# Patient Record
Sex: MALE | Race: WHITE
[De-identification: names, ages, dates, MRNs, and addresses within clinical notes are randomized per-mention and may not be internally consistent; named-entity substitution may affect disease eponyms.]

---

## 2021-03-15 ENCOUNTER — HOSPITAL ENCOUNTER (EMERGENCY)
Dept: HOSPITAL 61 - ER | Age: 61
Discharge: HOME | End: 2021-03-15
Payer: COMMERCIAL

## 2021-03-15 VITALS
SYSTOLIC BLOOD PRESSURE: 148 MMHG | SYSTOLIC BLOOD PRESSURE: 148 MMHG | DIASTOLIC BLOOD PRESSURE: 78 MMHG | DIASTOLIC BLOOD PRESSURE: 78 MMHG

## 2021-03-15 VITALS — HEIGHT: 64 IN | BODY MASS INDEX: 51.19 KG/M2 | WEIGHT: 299.83 LBS

## 2021-03-15 DIAGNOSIS — M79.671: Primary | ICD-10-CM

## 2021-03-15 DIAGNOSIS — R60.0: ICD-10-CM

## 2021-03-15 DIAGNOSIS — R06.02: ICD-10-CM

## 2021-03-15 DIAGNOSIS — Z98.890: ICD-10-CM

## 2021-03-15 LAB
ALBUMIN SERPL-MCNC: 3.6 G/DL (ref 3.4–5)
ALBUMIN/GLOB SERPL: 0.9 {RATIO} (ref 1–1.7)
ALP SERPL-CCNC: 75 U/L (ref 46–116)
ALT SERPL-CCNC: 33 U/L (ref 16–63)
ANION GAP SERPL CALC-SCNC: 5 MMOL/L (ref 6–14)
APTT BLD: 28 SEC (ref 24–38)
AST SERPL-CCNC: 21 U/L (ref 15–37)
BASOPHILS # BLD AUTO: 0.1 X10^3/UL (ref 0–0.2)
BASOPHILS NFR BLD: 1 % (ref 0–3)
BILIRUB SERPL-MCNC: 0.4 MG/DL (ref 0.2–1)
BUN SERPL-MCNC: 10 MG/DL (ref 8–26)
BUN/CREAT SERPL: 11 (ref 6–20)
CALCIUM SERPL-MCNC: 8.8 MG/DL (ref 8.5–10.1)
CHLORIDE SERPL-SCNC: 98 MMOL/L (ref 98–107)
CO2 SERPL-SCNC: 35 MMOL/L (ref 21–32)
CREAT SERPL-MCNC: 0.9 MG/DL (ref 0.7–1.3)
EOSINOPHIL NFR BLD: 0.2 X10^3/UL (ref 0–0.7)
EOSINOPHIL NFR BLD: 2 % (ref 0–3)
ERYTHROCYTE [DISTWIDTH] IN BLOOD BY AUTOMATED COUNT: 14.1 % (ref 11.5–14.5)
GFR SERPLBLD BASED ON 1.73 SQ M-ARVRAT: 86.7 ML/MIN
GLUCOSE SERPL-MCNC: 80 MG/DL (ref 70–99)
HCT VFR BLD CALC: 48.8 % (ref 39–53)
HGB BLD-MCNC: 16.6 G/DL (ref 13–17.5)
LYMPHOCYTES # BLD: 1.8 X10^3/UL (ref 1–4.8)
LYMPHOCYTES NFR BLD AUTO: 24 % (ref 24–48)
MAGNESIUM SERPL-MCNC: 2 MG/DL (ref 1.8–2.4)
MCH RBC QN AUTO: 31 PG (ref 25–35)
MCHC RBC AUTO-ENTMCNC: 34 G/DL (ref 31–37)
MCV RBC AUTO: 92 FL (ref 79–100)
MONO #: 0.6 X10^3/UL (ref 0–1.1)
MONOCYTES NFR BLD: 8 % (ref 0–9)
NEUT #: 5 X10^3/UL (ref 1.8–7.7)
NEUTROPHILS NFR BLD AUTO: 65 % (ref 31–73)
PLATELET # BLD AUTO: 230 X10^3/UL (ref 140–400)
POTASSIUM SERPL-SCNC: 4.3 MMOL/L (ref 3.5–5.1)
PROT SERPL-MCNC: 7.7 G/DL (ref 6.4–8.2)
PROTHROMBIN TIME: 12.8 SEC (ref 11.7–14)
RBC # BLD AUTO: 5.32 X10^6/UL (ref 4.3–5.7)
SODIUM SERPL-SCNC: 138 MMOL/L (ref 136–145)
WBC # BLD AUTO: 7.6 X10^3/UL (ref 4–11)

## 2021-03-15 PROCEDURE — 93005 ELECTROCARDIOGRAM TRACING: CPT

## 2021-03-15 PROCEDURE — 83880 ASSAY OF NATRIURETIC PEPTIDE: CPT

## 2021-03-15 PROCEDURE — 80053 COMPREHEN METABOLIC PANEL: CPT

## 2021-03-15 PROCEDURE — 36415 COLL VENOUS BLD VENIPUNCTURE: CPT

## 2021-03-15 PROCEDURE — 84484 ASSAY OF TROPONIN QUANT: CPT

## 2021-03-15 PROCEDURE — 71045 X-RAY EXAM CHEST 1 VIEW: CPT

## 2021-03-15 PROCEDURE — 93970 EXTREMITY STUDY: CPT

## 2021-03-15 PROCEDURE — 83735 ASSAY OF MAGNESIUM: CPT

## 2021-03-15 PROCEDURE — 85730 THROMBOPLASTIN TIME PARTIAL: CPT

## 2021-03-15 PROCEDURE — 99285 EMERGENCY DEPT VISIT HI MDM: CPT

## 2021-03-15 PROCEDURE — 85025 COMPLETE CBC W/AUTO DIFF WBC: CPT

## 2021-03-15 PROCEDURE — 85610 PROTHROMBIN TIME: CPT

## 2021-03-15 NOTE — RAD
INDICATION: Reason: NASIM  12 / Spl. Instructions:  / History: . Leg swelling. Concern for edema.



COMPARISON: None.



FINDINGS:



Single view of chest obtained.

Cardiomediastinal silhouette is prominent in size. Prominence of the interstitial markings bilaterall
y as well as fullness in the bilateral pulmonary hilum. Left lung base is obscured by cardiac silhoue
tte.





IMPRESSION:



*  Enlarged cardiomediastinal silhouette.



*  Mild interstitial prominence bilaterally. Causes such as mild pulmonary vascular congestion could 
have this appearance as well as mild interstitial infiltrate.



Electronically signed by: Juliano Patel MD (3/15/2021 6:58 PM) [id number]

## 2021-03-15 NOTE — EKG
[facility]

              [address] [place] [address]

Test Date:    2021-03-15               Test Time:    18:58:56

Pat Name:     IVON RODRIGUEZ            Department:   

Patient ID:   [id number]           Room:          

Gender:       M                        Technician:   

:          [id number]               Requested By: PHILLIP IGLESIAS

Order Number: [id number]           Reading MD:     

                                 Measurements

Intervals                              Axis          

Rate:         78                       P:            38

RI:           154                      QRS:          30

QRSD:         96                       T:            12

QT:           356                                    

QTc:          409                                    

                           Interpretive Statements

SINUS RHYTHM

QRS(T) CONTOUR ABNORMALITY

CONSIDER INFERIOR MYOCARDIAL DAMAGE

POSSIBLY ABNORMAL ECG

RI6.01

No previous ECG available for comparison

## 2021-03-15 NOTE — RAD
INDICATION: Reason: bl leg swelling, R worse than L / Spl. Instructions:  / History: 



COMPARISON: None.



TECHNIQUE: Grayscale, color and doppler ultrasound images were obtained of the bilateral lower extrem
ity venous vasculature. Some limitation secondary to overlying structures obscuring.



RIGHT:



No thrombus identified in the common femoral vein, femoral vein, popliteal vein or visualized calf ve
ins.



LEFT:



No thrombus identified in the common femoral vein, femoral vein, popliteal vein or visualized calf ve
ins.



IMPRESSION:



*   No thrombus identified in deep venous system of bilateral lower extremities.



Electronically signed by: Juliano Patel MD (3/15/2021 6:09 PM) [id number]

## 2021-03-15 NOTE — PHYS DOC
Past Medical History


Past Medical History:  No Pertinent History


 (PHILLIP IGLESIAS DO)


Past Surgical History:  Other


Additional Past Surgical Histo:  carpal tunnel


 (PHILLIP IGLESIAS DO)


Smoking Status:  Unknown if ever smoked


Additional Information:  


chew tobacco


Alcohol Use:  None


 (PHILLIP IGLESIAS DO)





General Adult


EDM:


Chief Complaint:  LOWER EXT PAIN





HPI:


HPI:


60-year-old male past medical history of obesity, presents the ED with 

complaints of " burning in my right leg," for the past week that is exacerbated 

when bearing weight on an extended knee.  Reports pain becomes sharp and shoots 

up his posterior leg but does not cross his knee.  Was seen at [place] 

[facility] and referred here for lower extremity ultrasound, concern for

DVT.  Patient has worked [employer] and [employer] for 28 years. 

Covid vaccine is up-to-date.  No known history of Covid.  Does not have a PCP 

for preventative routine care.  Takes no medications.  Denies any tobacco use or

alcohol abuse.  When asked if he is short of breath he states "a little winded 

only when I walk real far," but he attributes the shortness of breath to the 

radiating right foot pain (started after foot pain onset).  Not recall any prior

trauma or injury to the right lower extremity.  No history of recent 

fluoroquinolone abuse.  No falls or head injury.  FH-biological father with ACS.


 (PHILLIP IGLESIAS DO)





Review of Systems:


Review of Systems:


Constitutional:   Denies fever or chills. []


Eyes:   Denies change in visual acuity. []


HENT:   Denies nasal congestion or sore throat. [] 


Respiratory:   Denies cough or increased work of breathing or hemoptysis


Cardiovascular:   Denies chest pain or edema. [] 


GI:   Denies abdominal pain, nausea, vomiting, bloody stools or diarrhea. [] 


:  Denies dysuria or hematuria


Musculoskeletal:   Denies back pain or joint deformity


Integument:   Denies rash or diaphoresis


Neurologic:   Denies headache, focal weakness or sensory changes. [] 


Endocrine:   Denies polyuria or polydipsia. [] 


Lymphatic:  Denies swollen glands. [] 


Psychiatric:  Denies depression or anxiety. []


 (PHILLIP IGLESIAS DO)





Heart Score:


C/O Chest Pain:  No


Risk Factors:


Risk Factors:  DM, Current or recent (<one month) smoker, HTN, HLP, family 

history of CAD, obesity.


Risk Scores:


Score 0 - 3:  2.5% MACE over next 6 weeks - Discharge Home


Score 4 - 6:  20.3% MACE over next 6 weeks - Admit for Clinical Observation


Score 7 - 10:  72.7% MACE over next 6 weeks - Early Invasive Strategies


 (PHILLIP IGLESIAS DO)


C/O Chest Pain:  No


 (TARA FLOYD DO)





Current Medications:





Current Medications








 Medications


  (Trade)  Dose


 Ordered  Sig/Abisai  Start Time


 Stop Time Status Last Admin


Dose Admin


 


 Diazepam


  (Valium)  5 mg  1X  ONCE  3/15/21 17:45


 3/15/21 17:46   











 [facility])





Allergies:


Allergies:





Allergies








Coded Allergies Type Severity Reaction Last Updated Verified


 


  No Known Drug Allergies    3/15/21 No








 (PHILLIP IGLESIAS DO)





Physical Exam:


PE:





Constitutional: Well developed, well nourished, no acute distress, non-toxic 

appearance, obese


HENT: Normocephalic, atraumatic,


Eyes: EOMI, conjunctiva normal, no discharge.  


Neck: Normal range of motion,  supple, 


Cardiovascular: S1/2 present, regular rhythm


Lungs & Thorax: Speaking in full sentences, bilateral equal chest rise, no 

tachypnea or increased work of breathing, 94% room air


Abdomen:  soft, no tenderness, 


Skin: Warm, dry, no erythema, very dry skin over feet/posterior proximal calve 

w/scaling- superimposed fungal infection


Extremities: No tenderness, no cyanosis, bilateral lower extremity edema 

(appears equal) with DP pulses intact, slight pedal cyanosis although cap refill

 < 1 second, no severe pain out of proportion


Neurologic: Alert and oriented X 3, normal motor function, normal sensory 

function, no focal deficits noted. []


Psychologic: Affect normal, judgement normal, mood normal. []


 ([facility])





Current Patient Data:


Vital Signs:





                                   Vital Signs








  Date Time  Temp Pulse Resp B/P (MAP) Pulse Ox O2 Delivery O2 Flow Rate FiO2


 


3/15/21 16:20 98.3 77 20 159/76 (103) 94 Room Air  





 98.3       








 ([facility])





EKG:


EKG:


[]


 ([facility])


EKG:


EKG performed at 1858 heart rate 78 sinus rhythm no ST elevation no ST 

depression acute MI


 (TARA FLOYD DO)


Radiology/Procedures:


Radiology/Procedures:


[]


 (PHILLIP IGLESIAS DO)


Impression:





INDICATION: Reason: bl leg swelling, R worse than L / Spl. Instructions:  / 

History: 





COMPARISON: None.





TECHNIQUE: Grayscale, color and doppler ultrasound images were obtained of the 

bilateral lower extremity venous vasculature. Some limitation secondary to 

overlying structures obscuring.





RIGHT:





No thrombus identified in the common femoral vein, femoral vein, popliteal vein 

or visualized calf veins.





LEFT:





No thrombus identified in the common femoral vein, femoral vein, popliteal vein 

or visualized calf veins.





IMPRESSION:





*   No thrombus identified in deep venous system of bilateral lower extremities.





Electronically signed by: Juliano Patel MD (3/15/2021 6:09 PM) [id number]











IMPRESSION:





*  Enlarged cardiomediastinal silhouette.





*  Mild interstitial prominence bilaterally. Causes such as mild pulmonary 

vascular congestion could have this appearance as well as mild interstitial 

infiltrate.


 (TARA FLOYD DO)


Course & Med Decision Making:


Course & Med Decision Making


Pertinent Labs and Imaging studies reviewed. (See chart for details)





Concern for bilateral lower extremity swelling with right lower extremity 

radicular pain and exertional dyspnea for 1 week. Pt is pending labs, cxr, ekg 

and le ultrasound. Due to shift change, patient was signed out to oncoming 

physician Dr. Branch for further evaluation disposition.


 (PHILLIP IGLESIAS DO)


Course & Med Decision Making


Assumed care at shift change 1800hrs.


Pending radiologic imaging and labs.


US negative for DVT.


CXR radiologist concern for vascular congestion.


Labs reviewed - Troponin negative.


BNP pending.





Discussed radiology and labs with patient @ 2000hrs.


Patient would like to be discharged.


States only short of breath when walking-- attributes to his leg pain.





Will discharge patient at requested.


Will Rx lasix and ultram.


Patient states he can follow up with new PCP on Thursday.


Patient will also be provided of PCP to follow up with.


 


 (TARA FLOYD DO)


Dragon Disclaimer:


Dragon Disclaimer:


This electronic medical record was generated, in whole or in part, using a voice

 recognition dictation system.


 (PHILLIP IGLESIAS DO)





Departure


Departure


Impression:  


   Primary Impression:  


   Radicular pain of right lower extremity


   Additional Impressions:  


   Leg edema


   Leg pain


Disposition:  01 DC HOME SELF CARE/HOMELESS


Condition:  STABLE


Referrals:  


NO PCP (PCP)


Patient Instructions:  Edema


Scripts


Tramadol Hcl (ULTRAM) 50 Mg Tablet


1 TAB PO PRN Q6HRS PRN for pain MDD 4 Tablet(s) for 7 Days, #28 TAB 0 Refills


   Prov: TARA FLOYD DO         3/15/21 


Furosemide (LASIX) 20 Mg Tablet


20 MG PO DAILY, #20 TAB


   Prov: TARA FLOYD DO         3/15/21











PHILLIP IGLESIAS DO               Mar 15, 2021 17:46


TARA FLOYD DO            Mar 15, 2021 19:12

## 2021-03-22 ENCOUNTER — HOSPITAL ENCOUNTER (OUTPATIENT)
Dept: HOSPITAL 61 - KCIC | Age: 61
End: 2021-03-22
Attending: FAMILY MEDICINE
Payer: COMMERCIAL

## 2021-03-22 DIAGNOSIS — M79.661: ICD-10-CM

## 2021-03-22 DIAGNOSIS — M25.561: Primary | ICD-10-CM

## 2021-03-22 PROCEDURE — 73590 X-RAY EXAM OF LOWER LEG: CPT

## 2021-03-22 PROCEDURE — 73562 X-RAY EXAM OF KNEE 3: CPT

## 2021-03-22 NOTE — KCIC
Exam performed: 2 views right tibia fibula and   views 3 knee. 



Clinical indication: Right knee and neck pain



Date of Service: 3/22/2021 Comparison: None available



Findings:



AP and lateral view of the right tibia fibula demonstrates normal alignment of the knee and ankle barbara
nt. There is no acute fracture or dislocation. There is mild soft tissue swelling. No foreign body.



AP lateral and oblique views of the right knee is obtained. Normal alignment of the medial and latera
l tibiofemoral joint is preserved. The patellofemoral joint appears unremarkable. The articular zohreh
ns are smooth. There is no fracture or dislocation. Evidence of calcific loose body or joint effusion
 is absent.



Impression: 



1.  No acute bony abnormality seen in the right knee or right tibia fibula.



Electronically signed by: Lala Renteria MD (3/22/2021 4:58 PM) [id number]